# Patient Record
Sex: FEMALE | Race: OTHER | Employment: PART TIME | URBAN - METROPOLITAN AREA
[De-identification: names, ages, dates, MRNs, and addresses within clinical notes are randomized per-mention and may not be internally consistent; named-entity substitution may affect disease eponyms.]

---
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
## 2021-01-27 ENCOUNTER — TELEPHONE (OUTPATIENT)
Dept: PEDIATRICS CLINIC | Facility: CLINIC | Age: 21
End: 2021-01-27

## 2021-03-30 DIAGNOSIS — Z23 ENCOUNTER FOR IMMUNIZATION: ICD-10-CM

## 2022-03-02 ENCOUNTER — OFFICE VISIT (OUTPATIENT)
Dept: OBGYN CLINIC | Facility: CLINIC | Age: 22
End: 2022-03-02
Payer: COMMERCIAL

## 2022-03-02 VITALS
SYSTOLIC BLOOD PRESSURE: 130 MMHG | HEIGHT: 63 IN | WEIGHT: 147 LBS | BODY MASS INDEX: 26.05 KG/M2 | DIASTOLIC BLOOD PRESSURE: 80 MMHG

## 2022-03-02 DIAGNOSIS — N92.6 MENSTRUAL PROBLEM: Primary | ICD-10-CM

## 2022-03-02 PROCEDURE — 99213 OFFICE O/P EST LOW 20 MIN: CPT | Performed by: NURSE PRACTITIONER

## 2022-03-02 NOTE — PROGRESS NOTES
Karie Hale 24year-old who presents with complaints of prolonged menstrual bleeding  Patient's last menstrual period was 01/26/2022  Menarche age: 6  She states she has been bleeding now for 5 weeks  Bleeding started on 1/26/22 which was 3 weeks late from when her normal menses was due  Started out spotty and then it was on and off from spotting to heavy  Bleeding currently is light - changing once a day  Prior to this episode her cycles had been normal coming monthly  She has a similar episode happen in 2019 where she bled for 2 weeks  She has never been sexually  She states that she has an issue where she can not tolerate digital penetration because it hurts too much  She is not on birth control  The only change to her medical history is that she receive her third Covid vaccine - on December 15, 2021  ROS:  As indicated in HPI  All other ROS negative  Physical Exam  Constitutional:       Appearance: Normal appearance  Genitourinary:      Genitourinary Comments: Patient could not tolerate speculum exam - vaginismus noted on exam        Right Labia: No rash, tenderness, lesions, skin changes or Bartholin's cyst      Left Labia: No tenderness, lesions, skin changes, Bartholin's cyst or rash  No labial fusion noted  HENT:      Head: Normocephalic and atraumatic  Musculoskeletal:      Cervical back: Neck supple  Neurological:      Mental Status: She is alert  Skin:     General: Skin is warm  Psychiatric:         Behavior: Behavior is cooperative  Vitals and nursing note reviewed  Charito Morales was seen today for menstrual problem  Diagnoses and all orders for this visit:    Menstrual problem      Discussed options for managing her bleeding  Given that her bleeding at the moment is scant we can watch and wait or I can prescribe provera to help stop it  In addition I also reviewed the option of OCP for management of her cycles  At this time she would prefer to watch and wait   She will contact me if her bleeding persist or worsens  She states she feels like it is resolving

## 2022-04-05 ENCOUNTER — TELEPHONE (OUTPATIENT)
Dept: OBGYN CLINIC | Facility: CLINIC | Age: 22
End: 2022-04-05

## 2022-04-05 NOTE — TELEPHONE ENCOUNTER
Pt calling back to update as recom @ appt - pt seen for DUB 3/2/2022 - pt states she has been bleeding daily x past 2 months, changing thin maxi pad once daily (red bleeding) mostly spotting or light flow, some days clotting & heavier flow - 3/12/2022 had heavier flow & last episode heavier bleeding 3/17 - 3/18/2022 with clotting  She had Covid booster 12/15/2022

## 2022-04-07 DIAGNOSIS — N92.6 MENSTRUAL PROBLEM: Primary | ICD-10-CM

## 2022-04-07 RX ORDER — MEDROXYPROGESTERONE ACETATE 10 MG/1
10 TABLET ORAL DAILY
Qty: 10 TABLET | Refills: 0 | Status: SHIPPED | OUTPATIENT
Start: 2022-04-07 | End: 2022-04-17

## 2022-04-14 ENCOUNTER — TELEPHONE (OUTPATIENT)
Dept: OBGYN CLINIC | Facility: CLINIC | Age: 22
End: 2022-04-14

## 2022-04-14 NOTE — TELEPHONE ENCOUNTER
Spoke with patient, she is in [place]  She started the provera 7 days ago and the bleeding is not controlled  She is saturating a pad in less in less than an hour and passing clots  Patient advised to go to local ER for evaluation  She verbalized understanding

## 2022-04-14 NOTE — TELEPHONE ENCOUNTER
Patient would like to speak you, she has been taking provera for seven days and is complaining of heavy bleeding with clots starting today  States she is changing pads about seven times today  She denies shortness of breath or light headedness  Also complains of pelvic pressure and discomfort    Patient is anxious and wants to discuss with provider

## 2022-04-21 ENCOUNTER — TELEPHONE (OUTPATIENT)
Dept: OBGYN CLINIC | Facility: CLINIC | Age: 22
End: 2022-04-21

## 2022-04-21 NOTE — TELEPHONE ENCOUNTER
Phone call from mom/daughter regarding ER visit last week with heavy bleeding  Was questioning prescription of Oc's to decrease bleeding, was unable to tolerate dosing due to nausea and stopped  Offered apt with dr Junaid Blevins for Monday, unable to accept due to school schedule  Offered apt for tomorrow with dr Sukhdeep Wilson, patient declined  Scheduled apt w/ dr Junaid Blevins for June, she is unable to do sooner apt due to her college schedule  Will ask dr Junaid Blevins to review er notes if possible and give recommendation regarding OC prescribed

## 2022-05-19 ENCOUNTER — TELEPHONE (OUTPATIENT)
Dept: OBGYN CLINIC | Facility: CLINIC | Age: 22
End: 2022-05-19

## 2022-05-20 ENCOUNTER — TELEPHONE (OUTPATIENT)
Dept: OBGYN CLINIC | Facility: CLINIC | Age: 22
End: 2022-05-20

## 2022-05-20 NOTE — TELEPHONE ENCOUNTER
Anjelica pt's mother's as Siomaradamaris Kurtz) re: if pt had pelvic MRI done - pt has upcoming appt with [facility] on 5/23/2022  Also anjelica pt's as re: same

## 2024-02-21 PROBLEM — J01.90 ACUTE RHINOSINUSITIS: Status: RESOLVED | Noted: 2018-02-22 | Resolved: 2024-02-21

## 2024-03-12 ENCOUNTER — OFFICE VISIT (OUTPATIENT)
Dept: OBGYN CLINIC | Facility: CLINIC | Age: 24
End: 2024-03-12
Payer: COMMERCIAL

## 2024-03-12 VITALS — BODY MASS INDEX: 26.04 KG/M2 | DIASTOLIC BLOOD PRESSURE: 76 MMHG | SYSTOLIC BLOOD PRESSURE: 118 MMHG | WEIGHT: 147 LBS

## 2024-03-12 DIAGNOSIS — N92.1 MENOMETRORRHAGIA: Primary | ICD-10-CM

## 2024-03-12 PROCEDURE — 99215 OFFICE O/P EST HI 40 MIN: CPT | Performed by: OBSTETRICS & GYNECOLOGY

## 2024-03-12 RX ORDER — MULTIVITAMIN
TABLET ORAL
COMMUNITY

## 2024-03-12 RX ORDER — OMEGA-3 FATTY ACIDS/FISH OIL 300-1000MG
CAPSULE ORAL
COMMUNITY

## 2024-03-12 RX ORDER — CHOLECALCIFEROL (VITAMIN D3) 50 MCG
TABLET ORAL
COMMUNITY

## 2024-03-12 NOTE — PROGRESS NOTES
Assessment/Plan:  Reviewed extensive records including pelvic ultrasound, serial labs.  Discussed cervical cancer screening guidelines.  Patient declines pelvic exam and Pap smear today.  Implications/etiology of menometrorrhagia reviewed extensively.  Will obtain extensive labs and pelvic ultrasound to assess GYN anatomy.  Reviewed all precautions and implications as well as outcomes of heavy prolonged bleeding resulting in additional blood transfusions.  All questions answered.  She will seek out a provider specializing holistic versus naturopathic.  Return to office in 3 to 4 weeks for follow-up or as needed  No problem-specific Assessment & Plan notes found for this encounter.       Diagnoses and all orders for this visit:    Menometrorrhagia  -     CBC and differential  -     Ferritin  -     Iron  -     Ristocetin VWF Profile  -     TSH, 3rd generation  -     Progesterone; Future  -     Follicle stimulating hormone  -     Luteinizing hormone; Future  -     DHEA; Future  -     Testosterone, free, total; Future  -     Insulin, fasting; Future  -     Basic metabolic panel; Future  -     US pelvis transabdominal only; Future  -     Cortisol; Future    Other orders  -     Cholecalciferol (Vitamin D3 Super Strength) 50 MCG (2000 UT) TABS; Take by mouth  -     Multiple Vitamin (Multi-Vitamin Daily) TABS; Take by mouth  -     Omega 3 1000 MG CAPS; Take by mouth          Subjective:      Patient ID: Chandrika Epperson is a 23 y.o. female.    HPI    This is a 23-year-old female G0 accompanied with her mother today presents complaining of long history of irregular menses.  She went through menarche at age 11.  Her past medical history significant for hospitalization requiring blood transfusion due to heavy vaginal bleeding in 2022.  Her hemoglobin was noted to be 5.9.  She was discharged home with a tapering dose OCPs and initiate OCPs thereafter.  She took the 4 doses tapering and then discontinued.  Currently, she has  been bleeding on a daily basis since 10/2023.  She states the flow is not as significant.  She will go through 4 large boxes of pads per month.  She denies any chest pain, shortness of breath, rapid heart rate.  She was counseled in the past on initiating medical therapy with hormones and declines.  She would like to avoid all medications and prefers more of a natural approach.  She has been doing her own research.  I did inquire if she was researching a holistic provider or naturopath.  This is in the works.    She has had hair growth facially, sternum, abdomen.  Her weight has remained unchanged.  She does have a very clean diet and exercises 5 times per week.    Past surgical history significant for oral surgery at age 12 with no difficulty with bleeding complications.    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    Review of Systems   Constitutional:  Negative for fatigue, fever and unexpected weight change.   Respiratory:  Negative for cough, chest tightness, shortness of breath and wheezing.    Cardiovascular: Negative.  Negative for chest pain and palpitations.   Gastrointestinal: Negative.  Negative for abdominal distention, abdominal pain, blood in stool, constipation, diarrhea, nausea and vomiting.   Genitourinary:  Positive for menstrual problem. Negative for difficulty urinating, dyspareunia, dysuria, flank pain, frequency, genital sores, hematuria, pelvic pain, urgency, vaginal bleeding, vaginal discharge and vaginal pain.   Skin:  Negative for rash.         Objective:      /76   Wt 66.7 kg (147 lb)   LMP 10/20/2023 (Exact Date)   BMI 26.04 kg/m²          Physical Exam  Constitutional:       Appearance: Normal appearance.    Cardiovascular:      Rate and Rhythm: Normal rate and regular rhythm.   Pulmonary:      Effort: Pulmonary effort is normal.   Abdominal:      General: Bowel sounds are normal. There is no distension.      Palpations: Abdomen is soft.      Tenderness: There is no abdominal tenderness. There is no guarding.   Neurological:      Mental Status: She is alert.         Patient declines pelvic exam.

## 2024-03-19 ENCOUNTER — HOSPITAL ENCOUNTER (OUTPATIENT)
Dept: ULTRASOUND IMAGING | Facility: HOSPITAL | Age: 24
Discharge: HOME | End: 2024-03-19
Payer: COMMERCIAL

## 2024-03-19 DIAGNOSIS — N92.1 MENOMETRORRHAGIA: ICD-10-CM

## 2024-03-19 PROCEDURE — 76856 US EXAM PELVIC COMPLETE: CPT

## 2024-03-29 ENCOUNTER — APPOINTMENT (OUTPATIENT)
Dept: LAB | Facility: CLINIC | Age: 24
End: 2024-03-29
Payer: COMMERCIAL

## 2024-03-29 DIAGNOSIS — N92.1 MENOMETRORRHAGIA: ICD-10-CM

## 2024-03-29 LAB
ANION GAP SERPL CALCULATED.3IONS-SCNC: 6 MMOL/L (ref 4–13)
BASOPHILS # BLD AUTO: 0.04 THOUSANDS/ÂΜL (ref 0–0.1)
BASOPHILS NFR BLD AUTO: 1 % (ref 0–1)
BUN SERPL-MCNC: 12 MG/DL (ref 5–25)
CALCIUM SERPL-MCNC: 9.5 MG/DL (ref 8.4–10.2)
CHLORIDE SERPL-SCNC: 103 MMOL/L (ref 96–108)
CO2 SERPL-SCNC: 27 MMOL/L (ref 21–32)
CORTIS SERPL-MCNC: 13.9 UG/DL
CREAT SERPL-MCNC: 0.72 MG/DL (ref 0.6–1.3)
EOSINOPHIL # BLD AUTO: 0.05 THOUSAND/ÂΜL (ref 0–0.61)
EOSINOPHIL NFR BLD AUTO: 1 % (ref 0–6)
ERYTHROCYTE [DISTWIDTH] IN BLOOD BY AUTOMATED COUNT: 12.6 % (ref 11.6–15.1)
FERRITIN SERPL-MCNC: 3 NG/ML (ref 11–307)
FSH SERPL-ACNC: 5.4 MIU/ML
GFR SERPL CREATININE-BSD FRML MDRD: 118 ML/MIN/1.73SQ M
GLUCOSE P FAST SERPL-MCNC: 95 MG/DL (ref 65–99)
HCT VFR BLD AUTO: 36 % (ref 34.8–46.1)
HGB BLD-MCNC: 11.4 G/DL (ref 11.5–15.4)
IMM GRANULOCYTES # BLD AUTO: 0.01 THOUSAND/UL (ref 0–0.2)
IMM GRANULOCYTES NFR BLD AUTO: 0 % (ref 0–2)
INSULIN SERPL-ACNC: 12.37 UIU/ML (ref 1.9–23)
IRON SERPL-MCNC: 25 UG/DL (ref 50–212)
LH SERPL-ACNC: 16.7 MIU/ML
LYMPHOCYTES # BLD AUTO: 3.01 THOUSANDS/ÂΜL (ref 0.6–4.47)
LYMPHOCYTES NFR BLD AUTO: 41 % (ref 14–44)
MCH RBC QN AUTO: 25.7 PG (ref 26.8–34.3)
MCHC RBC AUTO-ENTMCNC: 31.7 G/DL (ref 31.4–37.4)
MCV RBC AUTO: 81 FL (ref 82–98)
MONOCYTES # BLD AUTO: 0.37 THOUSAND/ÂΜL (ref 0.17–1.22)
MONOCYTES NFR BLD AUTO: 5 % (ref 4–12)
NEUTROPHILS # BLD AUTO: 3.79 THOUSANDS/ÂΜL (ref 1.85–7.62)
NEUTS SEG NFR BLD AUTO: 52 % (ref 43–75)
NRBC BLD AUTO-RTO: 0 /100 WBCS
PLATELET # BLD AUTO: 295 THOUSANDS/UL (ref 149–390)
PMV BLD AUTO: 10.5 FL (ref 8.9–12.7)
POTASSIUM SERPL-SCNC: 3.6 MMOL/L (ref 3.5–5.3)
PROGEST SERPL-MCNC: 0.44 NG/ML
RBC # BLD AUTO: 4.44 MILLION/UL (ref 3.81–5.12)
SODIUM SERPL-SCNC: 136 MMOL/L (ref 135–147)
TSH SERPL DL<=0.05 MIU/L-ACNC: 2.9 UIU/ML (ref 0.45–4.5)
WBC # BLD AUTO: 7.27 THOUSAND/UL (ref 4.31–10.16)

## 2024-03-29 PROCEDURE — 83525 ASSAY OF INSULIN: CPT

## 2024-03-29 PROCEDURE — 83002 ASSAY OF GONADOTROPIN (LH): CPT

## 2024-03-29 PROCEDURE — 82533 TOTAL CORTISOL: CPT

## 2024-03-29 PROCEDURE — 84402 ASSAY OF FREE TESTOSTERONE: CPT

## 2024-03-29 PROCEDURE — 84403 ASSAY OF TOTAL TESTOSTERONE: CPT

## 2024-03-29 PROCEDURE — 82626 DEHYDROEPIANDROSTERONE: CPT

## 2024-03-29 PROCEDURE — 84144 ASSAY OF PROGESTERONE: CPT

## 2024-03-29 PROCEDURE — 80048 BASIC METABOLIC PNL TOTAL CA: CPT

## 2024-03-30 LAB
TESTOST FREE SERPL-MCNC: 2.4 PG/ML (ref 0–4.2)
TESTOST SERPL-MCNC: 46 NG/DL (ref 13–71)

## 2024-03-31 LAB
FACT XIIIA PPP-ACNC: 132 % (ref 56–140)
VWF:RCO ACT/NOR PPP PL AGG: 103 % (ref 50–200)

## 2024-04-04 LAB — FACT VIII AG ACT/NOR PPP IA: 149 %

## 2024-04-08 LAB — DHEA SERPL-MCNC: 694 NG/DL (ref 31–701)

## 2024-06-14 NOTE — TELEPHONE ENCOUNTER
02/04/21 5:38 PM     Thank you for your request  Your request has been received, reviewed, and the patient chart updated  The PCP has successfully been removed with a patient attribution note  This message will now be completed      Thank you  Freida Singh Quality 358: Patient-Centered Surgical Risk Assessment And Communication: Documentation of patient-specific risk assessment with a risk calculator based on multi-institutional clinical data, the specific risk calculator used, and communication of risk assessment from risk calculator with the patient or family. Quality 130: Documentation Of Current Medications In The Medical Record: Current Medications Documented Detail Level: Detailed Quality 226: Preventive Care And Screening: Tobacco Use: Screening And Cessation Intervention: Patient screened for tobacco use and is an ex/non-smoker